# Patient Record
Sex: FEMALE | Race: WHITE | Employment: FULL TIME | URBAN - METROPOLITAN AREA
[De-identification: names, ages, dates, MRNs, and addresses within clinical notes are randomized per-mention and may not be internally consistent; named-entity substitution may affect disease eponyms.]

---

## 2020-11-25 ENCOUNTER — HOSPITAL ENCOUNTER (OUTPATIENT)
Dept: PREADMISSION TESTING | Age: 55
Discharge: HOME | End: 2020-11-25
Payer: COMMERCIAL

## 2020-11-25 PROCEDURE — 87635 SARS-COV-2 COVID-19 AMP PRB: CPT

## 2020-11-26 LAB — SARS-COV-2, COV2NT: NOT DETECTED

## 2020-11-27 RX ORDER — DEXTROMETHORPHAN/PSEUDOEPHED 2.5-7.5/.8
1.2 DROPS ORAL
Status: CANCELLED | OUTPATIENT
Start: 2020-11-27

## 2020-11-27 RX ORDER — ATROPINE SULFATE 0.1 MG/ML
0.5 INJECTION INTRAVENOUS
Status: CANCELLED | OUTPATIENT
Start: 2020-11-27 | End: 2020-11-28

## 2020-11-27 RX ORDER — DIPHENHYDRAMINE HYDROCHLORIDE 50 MG/ML
50 INJECTION, SOLUTION INTRAMUSCULAR; INTRAVENOUS ONCE
Status: CANCELLED | OUTPATIENT
Start: 2020-11-27 | End: 2020-11-27

## 2020-11-27 RX ORDER — SODIUM CHLORIDE 0.9 % (FLUSH) 0.9 %
5-40 SYRINGE (ML) INJECTION AS NEEDED
Status: CANCELLED | OUTPATIENT
Start: 2020-11-27

## 2020-11-27 RX ORDER — NALOXONE HYDROCHLORIDE 0.4 MG/ML
0.4 INJECTION, SOLUTION INTRAMUSCULAR; INTRAVENOUS; SUBCUTANEOUS
Status: CANCELLED | OUTPATIENT
Start: 2020-11-27 | End: 2020-11-27

## 2020-11-27 RX ORDER — SODIUM CHLORIDE 0.9 % (FLUSH) 0.9 %
5-40 SYRINGE (ML) INJECTION EVERY 8 HOURS
Status: CANCELLED | OUTPATIENT
Start: 2020-11-27

## 2020-11-27 RX ORDER — EPINEPHRINE 0.1 MG/ML
1 INJECTION INTRACARDIAC; INTRAVENOUS
Status: CANCELLED | OUTPATIENT
Start: 2020-11-27 | End: 2020-11-28

## 2020-11-30 ENCOUNTER — HOSPITAL ENCOUNTER (OUTPATIENT)
Age: 55
Setting detail: OUTPATIENT SURGERY
Discharge: HOME | End: 2020-11-30
Attending: INTERNAL MEDICINE | Admitting: INTERNAL MEDICINE
Payer: COMMERCIAL

## 2020-11-30 VITALS
TEMPERATURE: 96.6 F | HEIGHT: 69 IN | WEIGHT: 246 LBS | HEART RATE: 88 BPM | BODY MASS INDEX: 36.43 KG/M2 | RESPIRATION RATE: 16 BRPM | DIASTOLIC BLOOD PRESSURE: 66 MMHG | SYSTOLIC BLOOD PRESSURE: 104 MMHG | OXYGEN SATURATION: 99 %

## 2020-11-30 PROCEDURE — 88305 TISSUE EXAM BY PATHOLOGIST: CPT

## 2020-11-30 PROCEDURE — 99153 MOD SED SAME PHYS/QHP EA: CPT | Performed by: INTERNAL MEDICINE

## 2020-11-30 PROCEDURE — 77030040361 HC SLV COMPR DVT MDII -B: Performed by: INTERNAL MEDICINE

## 2020-11-30 PROCEDURE — 77030003657 HC NDL SCLER BSC -B: Performed by: INTERNAL MEDICINE

## 2020-11-30 PROCEDURE — 77030039961 HC KT CUST COLON BSC -D: Performed by: INTERNAL MEDICINE

## 2020-11-30 PROCEDURE — G0500 MOD SEDAT ENDO SERVICE >5YRS: HCPCS | Performed by: INTERNAL MEDICINE

## 2020-11-30 PROCEDURE — 74011250636 HC RX REV CODE- 250/636: Performed by: INTERNAL MEDICINE

## 2020-11-30 PROCEDURE — 2709999900 HC NON-CHARGEABLE SUPPLY: Performed by: INTERNAL MEDICINE

## 2020-11-30 PROCEDURE — 77030013991 HC SNR POLYP ENDOSC BSC -A: Performed by: INTERNAL MEDICINE

## 2020-11-30 PROCEDURE — 76040000008: Performed by: INTERNAL MEDICINE

## 2020-11-30 RX ORDER — FLUMAZENIL 0.1 MG/ML
0.2 INJECTION INTRAVENOUS
Status: DISCONTINUED | OUTPATIENT
Start: 2020-11-30 | End: 2020-11-30 | Stop reason: HOSPADM

## 2020-11-30 RX ORDER — FENTANYL CITRATE 50 UG/ML
100 INJECTION, SOLUTION INTRAMUSCULAR; INTRAVENOUS
Status: DISCONTINUED | OUTPATIENT
Start: 2020-11-30 | End: 2020-11-30 | Stop reason: HOSPADM

## 2020-11-30 RX ORDER — MIDAZOLAM HYDROCHLORIDE 1 MG/ML
.25-5 INJECTION, SOLUTION INTRAMUSCULAR; INTRAVENOUS
Status: DISCONTINUED | OUTPATIENT
Start: 2020-11-30 | End: 2020-11-30 | Stop reason: HOSPADM

## 2020-11-30 RX ORDER — SODIUM CHLORIDE 9 MG/ML
1000 INJECTION, SOLUTION INTRAVENOUS CONTINUOUS
Status: DISCONTINUED | OUTPATIENT
Start: 2020-11-30 | End: 2020-11-30 | Stop reason: HOSPADM

## 2020-11-30 RX ORDER — LEVOTHYROXINE SODIUM 100 UG/1
100 TABLET ORAL
COMMUNITY

## 2020-11-30 RX ADMIN — SODIUM CHLORIDE 1000 ML: 900 INJECTION, SOLUTION INTRAVENOUS at 11:16

## 2020-11-30 NOTE — PROCEDURES
[facility]  Colonoscopy Procedure Report  _______________________________________________________  Patient: Gloria Fleming                                        Attending Physician: Radha Rhodes MD    Patient ID: [id number]                                    Referring Physician: Unknown, Provider    Exam Date: 2020      Introduction: A  63 y.o. female patient, presents for inpatient Colonoscopy    Indications: Screening for colon cancer average risk and asymptomatic. No family history of colon cancer. She has BM. 2 x3 daily. Mother   from Pancreatic Cancer at age 60 yo. Had previous C section. Takes Synthroid    Consent: The benefits, risks, and alternatives to the procedure were discussed and informed consent was obtained from the patient. Preparation: EKG, pulse, pulse oximetry and blood pressure were monitored throughout the procedure. ASA 2 Classification: Class II- . The heart is an S1-S2 and regular heart rate and rhythm. Lungs are clear to auscultation and percussion. Abdomen is soft, nondistended, and nontender. Mental Status: awake, alert, and oriented to person, place, and time    Medications:  · Fentanyl 100 mcg IV before procedure. · Versed 5 mg IV throughout the procedure. Rectal Exam: Normal Rectal Exam. No Blood. Pathology Specimens:  3    Procedure: The colonoscope was passed with ease through the anus under direct visualization and advanced to the cecum and 5 cm inside the terminal ileum. The patient required positioning on the back to aid in the passage of the scope. The scope was withdrawn and the mucosa was carefully examined. The quality of the preparation was excellent. The views were excellent. The patient's toleration of the procedure was excellent. The exam was done twice to the cecum. Retroflexion in the ascending colon. Total time is 39 minutes and withdrawal time is 30 minutes. Findings:    Rectum:   Small internal hemorrhoids.   Sigmoid: Slightly tortuous sigmoid colon with mild sigmoid diverticulosis. Descending Colon:   Normal   Transverse Colon:   3 flat polyps in the proximal transverse colon, one very flat and barely visible 7 mm hot snared after saline injection, the three others are smooth and flat 7 to 9 mm had to be hot snared after saline injection  Ascending Colon:   4 sessile polyps in the ascending colon. The smallest is 4 mm in the proximal ascending colon the largest is 9 mm in the distal ascending colon seen on retroflexion, hot snared after saline injection, the two others are 6 and 7 mm also hot snared. Cecum:   7 mm sessile polyp hot snared in the cecum  Terminal Ileum:   Normal      Unplanned Events: There were no unplanned events. Estimated Blood Loss: None  Impressions: Small internal hemorrhoids. Slightly tortuous sigmoid colon with mild sigmoid diverticulosis. 3 flat polyps in the proximal transverse colon, one very flat and barely visible 7 mm hot snared after saline injection, the three others are smooth and flat 7 to 9 mm had to be hot snared after saline injection. 4 sessile polyps in the ascending colon. The smallest is 4 mm in the proximal ascending colon the largest is 9 mm in the distal ascending colon seen on retroflexion, hot snared after saline injection, the two others are 6 and 7 mm also hot snared. 7 mm sessile polyp hot snared in the cecum. Normal Mucosa. No blood or AVM found. Complications: None; patient tolerated the procedure well. Recommendations:  · Discharge home when standard parameters are met. · Resume a high fiber diet. · Resume own medications. Avoid all NSAID's for  · Colonoscopy recommendation in 3 years awaiting the result of the histology.   · Take Miralax and/ or Colace 100 mg on regular basis if constipated    Procedure Codes:    · Renetta Vargas [[id number]]  · COLONOSCPY,FLEX,W/ [facility] [[id number]]     Endoscope Information:  Model Number(s)    [id number] Assistant: None  Signed By: Nicky Newton MD Date: 11/30/2020

## 2022-07-28 NOTE — H&P
Assessment/Plan  # Detail Type Description    1. Assessment Encounter for screening for cancer of colon (Z12.11). Patient Plan First colonoscopy ordered. Explained the procedure to the patient including all risks and benefits. These risks consist of something being missed on exam if bowel prep is not adequate, bleeding, and bowel perforation with possible need for admission to the hospital, and in the most extensive of  circumstances, the patient  may require surgery. Plan Orders Further diagnostic evaluations ordered today include(s) DIAGNOSTIC COLONOSCOPY to be performed. 2. Assessment Body mass index (BMI) 35.0-35.9, adult (Z68.35). Patient Plan BMI: 35.85. Pt is very tall, and has thick neck. She has been diagnosed as prediabetic, but has not started taking the diabetic medication Saxenda prescribed by the PCP yet. Told her to try to lose weight, and reduce stress. This 63year old female presents for Colon Cancer Screening. History of Present Illness:  1. Colon Cancer Screening   No prior screening. Denies risk factors. Pertinent negatives include abdominal pain, change in bowel habits, change in stool caliber, constipation, decreased appetite, diarrhea, melena, nausea, rectal bleeding, vomiting, weight gain and weight loss. Additional information: No family history of colon cancer and BM. 2 x3 daily. Mother   from Pancreatic Cancer. PROBLEM LIST:     Problem Description Onset Date Chronic Clinical Status Notes   Hypothyroidism  N               PAST MEDICAL/SURGICAL HISTORY   (Detailed)    Disease/disorder Onset Date Management Date Comments       CS 2017 - C section    Prediabetic nonclinical diabetes       HLD - Hyperlipidemia       Vitamin D deficiency       Morbid obesity       Hypothyroidism         OBSTETRIC HISTORY:  Not currently pregnant.        Family History  (Detailed)  Relationship Family Member Name  Age at Death Condition Onset Age Cause of Death   Mother  Y 59 Pancreatic Cancer  Y         Social History:  (Detailed)  Tobacco use reviewed. Preferred language is [employer]. MARITAL STATUS/FAMILY/SOCIAL SUPPORT  Marital status:    CHILDREN  Does not have children. Smoking status: Never smoker. TOBACCO SCREENING:  Patient has never used tobacco. Patient has not used tobacco in the last 30 days. Patient has not used smokeless tobacco in the last 30 days. SMOKING STATUS  Type Smoking Status Usage Per Day Years Used Pack Years Total Pack Years    Never smoker         TOBACCO/VAPING EXPOSURE  No passive vaping exposure. No passive smoke exposure. ALCOHOL  There is a history of alcohol use. Type: Beer. consumed rarely. CAFFEINE  The patient uses caffeine: soda - 1 cup a day. LIFESTYLE  Moderate activity level. Health club member. Exercise includes weights. Exercises 3-4 times a week. SLEEP PATTERNS  Patient has no changes to sleep patterns. Medications (active prior to today)  Medication Name Sig Description Start Date Stop Date Refilled Rx Elsewhere   Flonase Allergy Relief 50 mcg/actuation nasal spray,suspension inhale 2 spray by intranasal route  every day in each nostril 01/07/2019   N   montelukast 10 mg tablet take 1 tablet by oral route  every day in the evening 01/07/2019   N   Vitamin D3 5,000 unit tablet take 1 capsule by oral route  every day 07/17/2019   N   levothyroxine 100 mcg tablet take 1 tablet by oral route  every day 06/17/2020 06/17/2020 N   Saxenda 3 mg/0.5 mL (18 mg/3 mL) subcutaneous pen injector inject (3MG)  by subcutaneous route  every day in the abdomen, thigh, or upper arm for weight loss 06/23/2020 06/23/2020 N     Patient Status   Completed with information received for patient in a summary of care record. Medication Reconciliation  Medications reconciled today.   Medication Reviewed  Adherence Medication Name Sig Desc Elsewhere Status   taking as directed Flonase Allergy Relief 50 mcg/actuation nasal spray,suspension inhale 2 spray by intranasal route  every day in each nostril N Verified   taking as directed montelukast 10 mg tablet take 1 tablet by oral route  every day in the evening N Verified   taking as directed Vitamin D3 5,000 unit tablet take 1 capsule by oral route  every day N Verified   taking as directed levothyroxine 100 mcg tablet take 1 tablet by oral route  every day N Verified   taking as directed Saxenda 3 mg/0.5 mL (18 mg/3 mL) subcutaneous pen injector inject (3MG)  by subcutaneous route  every day in the abdomen, thigh, or upper arm for weight loss N Verified     Medications (Added, Continued or Stopped today)  Start Date Medication Directions PRN Status PRN Reason Instruction Stop Date   01/07/2019 Flonase Allergy Relief 50 mcg/actuation nasal spray,suspension inhale 2 spray by intranasal route  every day in each nostril N      06/17/2020 levothyroxine 100 mcg tablet take 1 tablet by oral route  every day N      01/07/2019 montelukast 10 mg tablet take 1 tablet by oral route  every day in the evening N      06/23/2020 Saxenda 3 mg/0.5 mL (18 mg/3 mL) subcutaneous pen injector inject (3MG)  by subcutaneous route  every day in the abdomen, thigh, or upper arm for weight loss N weight loss     11/18/2020 Suprep Bowel Prep Kit 17.5 gram-3.13 gram-1.6 gram oral solution Take as directed. N      07/17/2019 Vitamin D3 5,000 unit tablet take 1 capsule by oral route  every day N        Allergies:  Ingredient Reaction (Severity) Medication Name Comment   OXYCODONE HCL abdominal pain Percocet    Reviewed, no changes. ORDERS:  Status Lab Order Time Frame Comments   ordered DIAGNOSTIC COLONOSCOPY       Review of System  System Neg/Pos Details   Constitutional Negative Fever, Weight gain and Weight loss. ENMT Negative Sinus Infection. Eyes Negative Double vision. Respiratory Negative Asthma, Chronic cough and Dyspnea.    Cardio Negative Chest pain, Edema and Irregular heartbeat/palpitations. GI Negative Abdominal pain, Change in bowel habits, Change in stool caliber, Constipation, Decreased appetite, Diarrhea, Dysphagia, Heartburn, Hematemesis, Hematochezia, Melena, Nausea, Rectal bleeding, Reflux and Vomiting.  Negative Dysuria and Hematuria. Endocrine Negative Cold intolerance and Heat intolerance. Neuro Negative Dizziness, Headache, Numbness and Tremors. Psych Negative Anxiety, Depression and Increased stress. Integumentary Negative Hives, Pruritus and Rash. MS Negative Back pain, Joint pain and Myalgia. Hema/Lymph Negative Easy bleeding, Easy bruising and Lymphadenopathy. Allergic/Immuno Negative Food allergies and Immunosuppression. Vital Signs   Height  Time ft in cm Last Measured Height Position   2:05 PM 5.0 10.00 177.80 07/17/2019      Date/Time  Temp  Pulse  BP  MAP (Calculated)  Arterial Line 1 BP (mmHg)  BP Patient Position  Resp  SpO2  O2 Device  O2 Flow Rate (L/min)  Pre/Post Ductal  Weight    11/30/20 1042  96.6 °F (35.9 °C)Abnormal    103Abnormal    108/73  85      16  98 %  Room air      111.6 kg (246 lb)        PHYSICAL EXAM:  Exam Findings Details   Constitutional Normal Well developed. Eyes Normal Conjunctiva - Right: Normal, Left: Normal. Sclera - Right: Normal, Left: Normal.   Nasopharynx Normal Lips/teeth/gums - Normal.   Neck Exam * Inspection - thick. Neck Exam Normal Range of motion - Normal.   Respiratory Normal Inspection - Normal.   Cardiovascular Normal Regular rate and rhythm. No murmurs, gallops, or rubs. Vascular Normal Pulses - Brachial: Normal.   Abdomen * Obese. Abdomen Normal Inspection - Normal. Appliance(s) - None. Auscultation - Normal. Percussion - Normal. Anterior palpation - No guarding. No abdominal tenderness. No hepatic enlargement. No hernia. No ascites. Skin Normal Inspection - Normal.   Musculoskeletal Normal Hands/Wrist - Right: Normal, Left: Normal.   Extremity Normal No edema. Neurological Normal Fine motor skills - Normal.   Psychiatric Normal Orientation - Oriented to time, place, person & situation. Appropriate mood and affect.        No change in H&P 12-Jul-2022

## (undated) DEVICE — NDL FLTR TIP 5 MIC 18GX1.5IN --

## (undated) DEVICE — TRNQT TEXT 1X18IN BLU LF DISP -- CONVERT TO ITEM 362165

## (undated) DEVICE — SNARE POLYP SM W13MMXL240CM SHTH DIA2.4MM OVL FLX DISP

## (undated) DEVICE — SOLUTION IV 500ML 0.9% SOD CHL FLX CONT

## (undated) DEVICE — TRAP SPEC COLL POLYP POLYSTYR --

## (undated) DEVICE — NDL INJ SCLERO 25G 240CM -- INTERJECT M00518360 BX/5

## (undated) DEVICE — SET ADMIN 16ML TBNG L100IN 2 Y INJ SITE IV PIGGY BK DISP

## (undated) DEVICE — SYRINGE 50ML E/T

## (undated) DEVICE — CATH IV SAFE STR 22GX1IN BLU -- PROTECTIV PLUS

## (undated) DEVICE — WRISTBAND ID AD W2.5XL9.5CM RED VYN ADH CLSR UNI-PRINT

## (undated) DEVICE — NDL PRT INJ NSAF BLNT 18GX1.5 --

## (undated) DEVICE — SYR 5ML 1/5 GRAD LL NSAF LF --

## (undated) DEVICE — Device

## (undated) DEVICE — TUBING, SUCTION, 1/4" X 12', STRAIGHT: Brand: MEDLINE

## (undated) DEVICE — SPONGE GZ W4XL4IN COT 12 PLY TYP VII WVN C FLD DSGN

## (undated) DEVICE — GARMENT,MEDLINE,DVT,INT,CALF,MED, GEN2: Brand: MEDLINE

## (undated) DEVICE — SPONGE GZ W4XL4IN RAYON POLY 4 PLY NONWOVEN FASTER WICKING

## (undated) DEVICE — ERBE NESSY®PLATE 170 SPLIT; 168CM²; CABLE 3M: Brand: ERBE

## (undated) DEVICE — CANNULA CUSH AD W/ 14FT TBG

## (undated) DEVICE — CATH SUC CTRL PRT TRIFLO 14FR --

## (undated) DEVICE — SYR 3ML LL TIP 1/10ML GRAD --

## (undated) DEVICE — MAJ-1414 SINGLE USE ADPATER BIOPSY VALV: Brand: SINGLE USE ADAPTOR BIOPSY VALVE

## (undated) DEVICE — SINGLE PORT MANIFOLD: Brand: NEPTUNE 2

## (undated) DEVICE — KENDALL RADIOLUCENT FOAM MONITORING ELECTRODE RECTANGULAR SHAPE: Brand: KENDALL